# Patient Record
Sex: FEMALE | Race: ASIAN | NOT HISPANIC OR LATINO
[De-identification: names, ages, dates, MRNs, and addresses within clinical notes are randomized per-mention and may not be internally consistent; named-entity substitution may affect disease eponyms.]

---

## 2017-02-12 ENCOUNTER — RESULT REVIEW (OUTPATIENT)
Age: 42
End: 2017-02-12

## 2018-12-12 ENCOUNTER — RESULT REVIEW (OUTPATIENT)
Age: 43
End: 2018-12-12

## 2019-01-30 PROBLEM — Z00.00 ENCOUNTER FOR PREVENTIVE HEALTH EXAMINATION: Status: ACTIVE | Noted: 2019-01-30

## 2024-06-12 ENCOUNTER — EMERGENCY (EMERGENCY)
Facility: HOSPITAL | Age: 49
LOS: 1 days | Discharge: HOME | End: 2024-06-12
Attending: EMERGENCY MEDICINE | Admitting: EMERGENCY MEDICINE
Payer: COMMERCIAL

## 2024-06-12 VITALS
TEMPERATURE: 98 F | OXYGEN SATURATION: 100 % | HEART RATE: 76 BPM | SYSTOLIC BLOOD PRESSURE: 100 MMHG | RESPIRATION RATE: 16 BRPM | DIASTOLIC BLOOD PRESSURE: 64 MMHG

## 2024-06-12 VITALS
WEIGHT: 143.08 LBS | HEART RATE: 106 BPM | OXYGEN SATURATION: 100 % | RESPIRATION RATE: 16 BRPM | TEMPERATURE: 100 F | SYSTOLIC BLOOD PRESSURE: 100 MMHG | DIASTOLIC BLOOD PRESSURE: 61 MMHG

## 2024-06-12 LAB
ALBUMIN SERPL ELPH-MCNC: 3.7 G/DL — SIGNIFICANT CHANGE UP (ref 3.3–5)
ALP SERPL-CCNC: 99 U/L — SIGNIFICANT CHANGE UP (ref 40–120)
ALT FLD-CCNC: 17 U/L — SIGNIFICANT CHANGE UP (ref 4–33)
ANION GAP SERPL CALC-SCNC: 11 MMOL/L — SIGNIFICANT CHANGE UP (ref 7–14)
APPEARANCE UR: CLEAR — SIGNIFICANT CHANGE UP
AST SERPL-CCNC: 18 U/L — SIGNIFICANT CHANGE UP (ref 4–32)
BACTERIA # UR AUTO: ABNORMAL /HPF
BASE EXCESS BLDV CALC-SCNC: -3.4 MMOL/L — LOW (ref -2–3)
BASOPHILS # BLD AUTO: 0.03 K/UL — SIGNIFICANT CHANGE UP (ref 0–0.2)
BASOPHILS NFR BLD AUTO: 0.3 % — SIGNIFICANT CHANGE UP (ref 0–2)
BILIRUB SERPL-MCNC: 0.8 MG/DL — SIGNIFICANT CHANGE UP (ref 0.2–1.2)
BILIRUB UR-MCNC: NEGATIVE — SIGNIFICANT CHANGE UP
BLOOD GAS VENOUS COMPREHENSIVE RESULT: SIGNIFICANT CHANGE UP
BUN SERPL-MCNC: 8 MG/DL — SIGNIFICANT CHANGE UP (ref 7–23)
CALCIUM SERPL-MCNC: 8.9 MG/DL — SIGNIFICANT CHANGE UP (ref 8.4–10.5)
CAST: 0 /LPF — SIGNIFICANT CHANGE UP (ref 0–4)
CHLORIDE BLDV-SCNC: 105 MMOL/L — SIGNIFICANT CHANGE UP (ref 96–108)
CHLORIDE SERPL-SCNC: 104 MMOL/L — SIGNIFICANT CHANGE UP (ref 98–107)
CO2 BLDV-SCNC: 22.5 MMOL/L — SIGNIFICANT CHANGE UP (ref 22–26)
CO2 SERPL-SCNC: 20 MMOL/L — LOW (ref 22–31)
COLOR SPEC: YELLOW — SIGNIFICANT CHANGE UP
CREAT SERPL-MCNC: 0.75 MG/DL — SIGNIFICANT CHANGE UP (ref 0.5–1.3)
DIFF PNL FLD: ABNORMAL
EGFR: 99 ML/MIN/1.73M2 — SIGNIFICANT CHANGE UP
EOSINOPHIL # BLD AUTO: 0.03 K/UL — SIGNIFICANT CHANGE UP (ref 0–0.5)
EOSINOPHIL NFR BLD AUTO: 0.3 % — SIGNIFICANT CHANGE UP (ref 0–6)
GAS PNL BLDV: 134 MMOL/L — LOW (ref 136–145)
GLUCOSE BLDV-MCNC: 99 MG/DL — SIGNIFICANT CHANGE UP (ref 70–99)
GLUCOSE SERPL-MCNC: 102 MG/DL — HIGH (ref 70–99)
GLUCOSE UR QL: NEGATIVE MG/DL — SIGNIFICANT CHANGE UP
HCG SERPL-ACNC: <1 MIU/ML — SIGNIFICANT CHANGE UP
HCO3 BLDV-SCNC: 21 MMOL/L — LOW (ref 22–29)
HCT VFR BLD CALC: 35.4 % — SIGNIFICANT CHANGE UP (ref 34.5–45)
HCT VFR BLDA CALC: 37 % — SIGNIFICANT CHANGE UP (ref 34.5–46.5)
HGB BLD CALC-MCNC: 12.2 G/DL — SIGNIFICANT CHANGE UP (ref 11.7–16.1)
HGB BLD-MCNC: 12.2 G/DL — SIGNIFICANT CHANGE UP (ref 11.5–15.5)
IANC: 9.89 K/UL — HIGH (ref 1.8–7.4)
IMM GRANULOCYTES NFR BLD AUTO: 0.3 % — SIGNIFICANT CHANGE UP (ref 0–0.9)
KETONES UR-MCNC: NEGATIVE MG/DL — SIGNIFICANT CHANGE UP
LACTATE BLDV-MCNC: 1.2 MMOL/L — SIGNIFICANT CHANGE UP (ref 0.5–2)
LEUKOCYTE ESTERASE UR-ACNC: ABNORMAL
LIDOCAIN IGE QN: 63 U/L — HIGH (ref 7–60)
LYMPHOCYTES # BLD AUTO: 0.81 K/UL — LOW (ref 1–3.3)
LYMPHOCYTES # BLD AUTO: 7.1 % — LOW (ref 13–44)
MCHC RBC-ENTMCNC: 29.8 PG — SIGNIFICANT CHANGE UP (ref 27–34)
MCHC RBC-ENTMCNC: 34.5 GM/DL — SIGNIFICANT CHANGE UP (ref 32–36)
MCV RBC AUTO: 86.6 FL — SIGNIFICANT CHANGE UP (ref 80–100)
MONOCYTES # BLD AUTO: 0.67 K/UL — SIGNIFICANT CHANGE UP (ref 0–0.9)
MONOCYTES NFR BLD AUTO: 5.8 % — SIGNIFICANT CHANGE UP (ref 2–14)
NEUTROPHILS # BLD AUTO: 9.89 K/UL — HIGH (ref 1.8–7.4)
NEUTROPHILS NFR BLD AUTO: 86.2 % — HIGH (ref 43–77)
NITRITE UR-MCNC: NEGATIVE — SIGNIFICANT CHANGE UP
NRBC # BLD: 0 /100 WBCS — SIGNIFICANT CHANGE UP (ref 0–0)
NRBC # FLD: 0 K/UL — SIGNIFICANT CHANGE UP (ref 0–0)
PCO2 BLDV: 37 MMHG — LOW (ref 39–52)
PH BLDV: 7.37 — SIGNIFICANT CHANGE UP (ref 7.32–7.43)
PH UR: 6.5 — SIGNIFICANT CHANGE UP (ref 5–8)
PLATELET # BLD AUTO: 275 K/UL — SIGNIFICANT CHANGE UP (ref 150–400)
PO2 BLDV: 49 MMHG — HIGH (ref 25–45)
POTASSIUM BLDV-SCNC: 3.9 MMOL/L — SIGNIFICANT CHANGE UP (ref 3.5–5.1)
POTASSIUM SERPL-MCNC: 4 MMOL/L — SIGNIFICANT CHANGE UP (ref 3.5–5.3)
POTASSIUM SERPL-SCNC: 4 MMOL/L — SIGNIFICANT CHANGE UP (ref 3.5–5.3)
PROT SERPL-MCNC: 6.5 G/DL — SIGNIFICANT CHANGE UP (ref 6–8.3)
PROT UR-MCNC: 30 MG/DL
RBC # BLD: 4.09 M/UL — SIGNIFICANT CHANGE UP (ref 3.8–5.2)
RBC # FLD: 12.3 % — SIGNIFICANT CHANGE UP (ref 10.3–14.5)
RBC CASTS # UR COMP ASSIST: 23 /HPF — HIGH (ref 0–4)
REVIEW: SIGNIFICANT CHANGE UP
SAO2 % BLDV: 82.1 % — SIGNIFICANT CHANGE UP (ref 67–88)
SODIUM SERPL-SCNC: 135 MMOL/L — SIGNIFICANT CHANGE UP (ref 135–145)
SP GR SPEC: 1.01 — SIGNIFICANT CHANGE UP (ref 1–1.03)
SQUAMOUS # UR AUTO: 8 /HPF — HIGH (ref 0–5)
UROBILINOGEN FLD QL: 0.2 MG/DL — SIGNIFICANT CHANGE UP (ref 0.2–1)
WBC # BLD: 11.47 K/UL — HIGH (ref 3.8–10.5)
WBC # FLD AUTO: 11.47 K/UL — HIGH (ref 3.8–10.5)
WBC UR QL: 10 /HPF — HIGH (ref 0–5)

## 2024-06-12 PROCEDURE — 76830 TRANSVAGINAL US NON-OB: CPT | Mod: 26

## 2024-06-12 PROCEDURE — 99285 EMERGENCY DEPT VISIT HI MDM: CPT

## 2024-06-12 RX ORDER — ONDANSETRON 8 MG/1
4 TABLET, FILM COATED ORAL ONCE
Refills: 0 | Status: COMPLETED | OUTPATIENT
Start: 2024-06-12 | End: 2024-06-12

## 2024-06-12 RX ORDER — SODIUM CHLORIDE 9 MG/ML
1000 INJECTION INTRAMUSCULAR; INTRAVENOUS; SUBCUTANEOUS ONCE
Refills: 0 | Status: COMPLETED | OUTPATIENT
Start: 2024-06-12 | End: 2024-06-12

## 2024-06-12 RX ADMIN — SODIUM CHLORIDE 1000 MILLILITER(S): 9 INJECTION INTRAMUSCULAR; INTRAVENOUS; SUBCUTANEOUS at 22:26

## 2024-06-12 NOTE — ED ADULT TRIAGE NOTE - CHIEF COMPLAINT QUOTE
Pt c/o sharp RLQ abd pain, fever, diarrhea, nausea, body aches x 2am. pt recently returned from [facility]. sent in by urgent care to rule out appendicitis. No complaints of chest pain,   dizziness, vomiting  SOB,  , chills verbalized..

## 2024-06-12 NOTE — ED ADULT NURSE NOTE - CHIEF COMPLAINT QUOTE
Pt c/o sharp RLQ abd pain, fever, diarrhea, nausea, body aches x 2am. pt recently returned from [facility]. sent in by urgent care to rule out appendicitis. No complaints of chest pain,   dizziness, vomiting  SOB,  , chills verbalized..

## 2024-06-12 NOTE — ED ADULT NURSE NOTE - OBJECTIVE STATEMENT
Pt c/o abdominal pain, nausea, diarrhea since early this morning. Denies chest pain/SOB. Respirations even and unlabored. No apparent distress noted at this time. 20g IV placed in L FA, labs drawn as ordered. Normal saline bolus infusing as ordered. Pt declines zofran at present time, stating she will ask for the med when she feels nauseous. Denies nausea at present time. Pt taken to ultrasound.

## 2024-06-12 NOTE — ED ADULT NURSE REASSESSMENT NOTE - NS ED NURSE REASSESS COMMENT FT1
Pt a&ox4, ambulatory, No complaints of chest pain, headache, nausea, dizziness, vomiting, SOB, fever, chills verbalized. Airway is patent, speaking in clear and coherent sentences. Respirations are even and unlabored, no signs of respiratory distress.

## 2024-06-12 NOTE — ED ADULT NURSE NOTE - NSFALLUNIVINTERV_ED_ALL_ED
Bed/Stretcher in lowest position, wheels locked, appropriate side rails in place/Call bell, personal items and telephone in reach/Instruct patient to call for assistance before getting out of bed/chair/stretcher/Non-slip footwear applied when patient is off stretcher/[place] to call system/Physically safe environment - no spills, clutter or unnecessary equipment/Purposeful proactive rounding/Room/bathroom lighting operational, light cord in reach

## 2024-06-13 PROCEDURE — 74177 CT ABD & PELVIS W/CONTRAST: CPT | Mod: 26,MC

## 2024-06-13 RX ORDER — CEFUROXIME AXETIL 250 MG
500 TABLET ORAL ONCE
Refills: 0 | Status: COMPLETED | OUTPATIENT
Start: 2024-06-13 | End: 2024-06-13

## 2024-06-13 RX ORDER — ACETAMINOPHEN 500 MG
650 TABLET ORAL ONCE
Refills: 0 | Status: COMPLETED | OUTPATIENT
Start: 2024-06-13 | End: 2024-06-13

## 2024-06-13 RX ORDER — CEFUROXIME AXETIL 250 MG
1 TABLET ORAL
Qty: 14 | Refills: 0
Start: 2024-06-13 | End: 2024-06-19

## 2024-06-13 RX ORDER — KETOROLAC TROMETHAMINE 30 MG/ML
15 SYRINGE (ML) INJECTION ONCE
Refills: 0 | Status: DISCONTINUED | OUTPATIENT
Start: 2024-06-13 | End: 2024-06-13

## 2024-06-13 RX ADMIN — Medication 650 MILLIGRAM(S): at 01:34

## 2024-06-13 RX ADMIN — Medication 500 MILLIGRAM(S): at 04:00

## 2024-06-13 NOTE — ED PROVIDER NOTE - PHYSICAL EXAMINATION
General: Well appearing in no acute distress, alert and cooperative  Eyes: PERRLA, no conjunctival injection, no scleral icterus  Cardiac: Regular rate and regular rhythm, no murmurs  Resp: Unlabored respiratory effort, lungs CTAB, speaking in full sentences, no wheezes  Abd: +RLQ tenderness to palpation. Abd is soft, non-distended, no guarding or rebound tenderness. No CVA tenderness b/l.   Skin: Warm and dry, no rashes/abrasions/lacerations  Neuro: AO x 3, moves all extremities symmetrically, Motor strength 5/5 bilaterally UE and LE, sensation grossly intact

## 2024-06-13 NOTE — ED PROVIDER NOTE - CLINICAL SUMMARY MEDICAL DECISION MAKING FREE TEXT BOX
48-year-old female with past medical history of hyperlipidemia who presents to the ED complaining of a sharp onset of right lower quadrant pain this morning.  Patient states that she woke up around 2 AM with severe abdominal pain subsequently had about 3 episodes of diarrhea.  Patient notes associated nausea but denies any vomiting.  Patient also reports subjective fever and chills.  Patient was seen at urgent care prior to arrival to the ED and was sent to the ED for CT to rule out appendicitis.  Denies any chest pain, shortness of breath, dysuria hematuria back pain or flank pain.  Patient reports recent travel to [facility].  No known drug allergies.    Patient currently afebrile, hemodynamically stable, spO2 100%. Based on history and physical, differentials include but are not limited to appendicitis vs ovarian cyst vs ruptured ovarian cyst vs ectopic pregnancy vs kidney stone vs UTI/pyelo. Plan to assess patient for acute pathology as listed above with labs, CT, US. Labs within normal limits. hCG negative.  No lactate.  Urine with trace leukocyte esterase and elevated white blood cell count of 10, will start antibiotics, pending culture.  CT with no acute findings ultrasound with right ovarian 1.4 cm endometrioma versus hemorrhagic cyst.  Possible adenomyosis.  Shared decision making -discussed results with patient.  Advised patient to follow-up with GYN for further evaluation of ultrasound findings.  Patient understands and agrees with plan for discharge at this time.  Patient with improvement in pain after receiving medications in the ED and feels well enough to go home.  Patient to be DC'd from the ED with strict return precautions and GYN follow-up. 48-year-old female with past medical history of hyperlipidemia who presents to the ED complaining of a sharp onset of right lower quadrant pain this morning.  Patient states that she woke up around 2 AM with severe abdominal pain subsequently had about 3 episodes of diarrhea.  Patient notes associated nausea but denies any vomiting.  Patient also reports subjective fever and chills.  Patient was seen at urgent care prior to arrival to the ED and was sent to the ED for CT to rule out appendicitis.  Denies any chest pain, shortness of breath, dysuria hematuria back pain or flank pain.  Patient reports recent travel to [facility].  No known drug allergies.    Patient currently afebrile, hemodynamically stable, spO2 100%. Physical exam with mild RLQ tenderness to palpation, however pt received tylenol from urgent care prior to arrival to the ED. Based on history and physical, differentials include but are not limited to appendicitis vs ovarian cyst vs ruptured ovarian cyst vs ectopic pregnancy vs kidney stone vs UTI/pyelo. Plan to assess patient for acute pathology as listed above with labs, CT, US. Labs within normal limits. hCG negative.  No elevated lactate.  Urine with trace leukocyte esterase and elevated white blood cell count of 10, will start antibiotics, pending culture.  CT with no acute findings. Ultrasound with right ovarian 1.4 cm endometrioma versus hemorrhagic cyst.  Possible adenomyosis.  Shared decision making -discussed results with patient.  Advised patient to follow-up with GYN for further evaluation of ultrasound findings.  Patient understands and agrees with plan for discharge at this time.  Patient with improvement in pain after receiving medications in the ED and feels well enough to go home.  Patient to be DC'd from the ED with strict return precautions and GYN follow-up. Janine att: 48-year-old female with past medical history of hyperlipidemia who presents to the ED complaining of a sharp onset of right lower quadrant pain this morning.  Patient states that she woke up around 2 AM with severe abdominal pain subsequently had about 3 episodes of diarrhea.  Patient notes associated nausea but denies any vomiting.  Patient also reports subjective fever and chills.  Patient was seen at urgent care prior to arrival to the ED and was sent to the ED for CT to rule out appendicitis.  Denies any chest pain, shortness of breath, dysuria hematuria back pain or flank pain.  Patient reports recent travel to [place].  No known drug allergies.    Patient currently afebrile, hemodynamically stable, spO2 100%. Physical exam with mild RLQ tenderness to palpation, however pt received tylenol from urgent care prior to arrival to the ED. Based on history and physical, differentials include but are not limited to appendicitis vs ovarian cyst vs ruptured ovarian cyst vs ectopic pregnancy vs kidney stone vs UTI/pyelo. Plan to assess patient for acute pathology as listed above with labs, CT, US. Labs within normal limits. hCG negative.  No elevated lactate.  Urine with trace leukocyte esterase and elevated white blood cell count of 10, will start antibiotics, pending culture.  CT with no acute findings. Ultrasound with right ovarian 1.4 cm endometrioma versus hemorrhagic cyst.  Possible adenomyosis.  Shared decision making -discussed results with patient.  Advised patient to follow-up with GYN for further evaluation of ultrasound findings.  Patient understands and agrees with plan for discharge at this time.  Patient with improvement in pain after receiving medications in the ED and feels well enough to go home.  Patient to be DC'd from the ED with strict return precautions and GYN follow-up.

## 2024-06-13 NOTE — ED PROVIDER NOTE - OBJECTIVE STATEMENT
48-year-old female with past medical history of hyperlipidemia who presents to the ED complaining of a sharp onset of right lower quadrant pain this morning.  Patient states that she woke up around 2 AM with severe abdominal pain subsequently had about 3 episodes of diarrhea.  Patient notes associated nausea but denies any vomiting.  Patient also reports subjective fever and chills.  Patient was seen at urgent care prior to arrival to the ED and was sent to the ED for CT to rule out appendicitis.  Denies any chest pain, shortness of breath, dysuria hematuria back pain or flank pain.  Patient reports recent travel to [facility].  No known drug allergies. 48-year-old female with past medical history of hyperlipidemia who presents to the ED complaining of a sharp acute onset of right lower quadrant pain this morning.  Patient states that she woke up around 2 AM on 6/12/24 with severe abdominal pain and subsequently had about 3 episodes of diarrhea.  Patient notes associated nausea but denies any vomiting.  Patient also reports subjective fever and chills.  Patient was seen at urgent care prior to arrival to the ED and was sent to the ED for CT to rule out appendicitis. Of note, pt with recent travel to [facility] with return to [place] two days ago. Denies any chest pain, shortness of breath, dysuria hematuria back pain or flank pain. No known drug allergies.

## 2024-06-13 NOTE — ED PROVIDER NOTE - NSFOLLOWUPINSTRUCTIONS_ED_ALL_ED_FT
You were seen in the ED for abdominal pain. Your CT scan showed a normal appendix.  Your ultrasound showed a right ovarian cyst and possible adenomyosis.     I recommend that you follow-up with your GYN for further evaluation and management of this.    You were also found to have a UTI during her visit to the ED.  Please take one cefuroxime 500 mg tablet twice a day for the next 7 days and complete entire course of antibiotics.     For pain;   1. Take Tylenol 650mg (Two regular strength 325 mg pills) every 4-6 hours or two extra strength 500mg tablets every 8 hours as needed for pain or fevers. Do not exceed 1000mg at one time or 4000mg in a 24 hour period.  2. Take Motrin (also sold as Advil or Ibuprofen) 400-600 mg (two or three 200 mg over the counter pills) every 8 hours as needed for moderate pain or fevers-- take with food; do not take for more than 5 consecutive days.    Follow up with your primary care physician in 48-72 hours- bring copies of your results.  Return to the ER for worsening or persistent symptoms, including but not limited to worsening/persistent pain, fevers, vomiting, chest pain, black or bloody stools, fevers, passing out, lightheadedness, dizziness, inability to pass bowel movements or gas, abdominal distension/swelling, and/or ANY NEW OR CONCERNING SYMPTOMS.

## 2024-06-13 NOTE — ED PROVIDER NOTE - PATIENT PORTAL LINK FT
You can access the FollowMyHealth Patient Portal offered by [facility] by registering at the following website: [email or web address]. By joining [facility]’s FollowMyHealth portal, you will also be able to view your health information using other applications (apps) compatible with our system.

## 2024-06-15 LAB
CULTURE RESULTS: ABNORMAL
SPECIMEN SOURCE: SIGNIFICANT CHANGE UP